# Patient Record
Sex: MALE | Race: WHITE | NOT HISPANIC OR LATINO | Employment: STUDENT | URBAN - METROPOLITAN AREA
[De-identification: names, ages, dates, MRNs, and addresses within clinical notes are randomized per-mention and may not be internally consistent; named-entity substitution may affect disease eponyms.]

---

## 2023-09-14 ENCOUNTER — OFFICE VISIT (OUTPATIENT)
Dept: FAMILY MEDICINE | Facility: CLINIC | Age: 18
End: 2023-09-14

## 2023-09-14 VITALS
SYSTOLIC BLOOD PRESSURE: 124 MMHG | OXYGEN SATURATION: 96 % | HEART RATE: 91 BPM | WEIGHT: 310 LBS | DIASTOLIC BLOOD PRESSURE: 80 MMHG | TEMPERATURE: 98.1 F

## 2023-09-14 DIAGNOSIS — F90.2 ADHD (ATTENTION DEFICIT HYPERACTIVITY DISORDER), COMBINED TYPE: Primary | ICD-10-CM

## 2023-09-14 PROCEDURE — 99203 OFFICE O/P NEW LOW 30 MIN: CPT | Performed by: PHYSICIAN ASSISTANT

## 2023-09-14 RX ORDER — DEXTROAMPHETAMINE SACCHARATE, AMPHETAMINE ASPARTATE, DEXTROAMPHETAMINE SULFATE AND AMPHETAMINE SULFATE 2.5; 2.5; 2.5; 2.5 MG/1; MG/1; MG/1; MG/1
10 TABLET ORAL DAILY
Qty: 30 TABLET | Refills: 0 | Status: SHIPPED | OUTPATIENT
Start: 2023-10-15 | End: 2023-11-14

## 2023-09-14 RX ORDER — LISDEXAMFETAMINE DIMESYLATE 70 MG/1
70 CAPSULE ORAL EVERY MORNING
COMMUNITY
End: 2023-11-30

## 2023-09-14 RX ORDER — LISDEXAMFETAMINE DIMESYLATE 70 MG/1
70 CAPSULE ORAL DAILY
Qty: 30 CAPSULE | Refills: 0 | Status: SHIPPED | OUTPATIENT
Start: 2023-09-14 | End: 2023-09-18 | Stop reason: ALTCHOICE

## 2023-09-14 RX ORDER — DEXTROAMPHETAMINE SACCHARATE, AMPHETAMINE ASPARTATE, DEXTROAMPHETAMINE SULFATE AND AMPHETAMINE SULFATE 2.5; 2.5; 2.5; 2.5 MG/1; MG/1; MG/1; MG/1
10 TABLET ORAL DAILY
Qty: 30 TABLET | Refills: 0 | Status: SHIPPED | OUTPATIENT
Start: 2023-11-15 | End: 2023-12-15

## 2023-09-14 RX ORDER — EPINEPHRINE 0.3 MG/.3ML
0.3 INJECTION SUBCUTANEOUS PRN
COMMUNITY
Start: 2023-03-29

## 2023-09-14 RX ORDER — DEXTROAMPHETAMINE SACCHARATE, AMPHETAMINE ASPARTATE, DEXTROAMPHETAMINE SULFATE AND AMPHETAMINE SULFATE 2.5; 2.5; 2.5; 2.5 MG/1; MG/1; MG/1; MG/1
10 TABLET ORAL DAILY
Qty: 30 TABLET | Refills: 0 | Status: SHIPPED | OUTPATIENT
Start: 2023-09-14 | End: 2023-09-19

## 2023-09-14 RX ORDER — LISDEXAMFETAMINE DIMESYLATE 70 MG/1
70 CAPSULE ORAL DAILY
Qty: 30 CAPSULE | Refills: 0 | Status: SHIPPED | OUTPATIENT
Start: 2023-10-15 | End: 2023-11-14

## 2023-09-14 RX ORDER — DEXTROAMPHETAMINE SACCHARATE, AMPHETAMINE ASPARTATE, DEXTROAMPHETAMINE SULFATE AND AMPHETAMINE SULFATE 2.5; 2.5; 2.5; 2.5 MG/1; MG/1; MG/1; MG/1
10 TABLET ORAL EVERY EVENING
COMMUNITY
End: 2024-08-02

## 2023-09-14 RX ORDER — LISDEXAMFETAMINE DIMESYLATE 70 MG/1
70 CAPSULE ORAL DAILY
Qty: 30 CAPSULE | Refills: 0 | Status: SHIPPED | OUTPATIENT
Start: 2023-11-15 | End: 2023-11-30

## 2023-09-14 NOTE — PROGRESS NOTES
Assessment & Plan     ADHD (attention deficit hyperactivity disorder), combined type-pt using meds responsibly, highest dose of Vyvanse so cannot increase further. Discussed with pt and he will work on consistently taking Adderall in afternoon and see how his symptoms are controlled based on this   Monitor for worsening attention symptoms  Consider change of meds if uncontrolled in 3 months  - lisdexamfetamine (VYVANSE) 70 MG capsule  Dispense: 30 capsule; Refill: 0  - lisdexamfetamine (VYVANSE) 70 MG capsule  Dispense: 30 capsule; Refill: 0  - lisdexamfetamine (VYVANSE) 70 MG capsule  Dispense: 30 capsule; Refill: 0  - amphetamine-dextroamphetamine (ADDERALL) 10 MG tablet  Dispense: 30 tablet; Refill: 0  - amphetamine-dextroamphetamine (ADDERALL) 10 MG tablet  Dispense: 30 tablet; Refill: 0  - amphetamine-dextroamphetamine (ADDERALL) 10 MG tablet  Dispense: 30 tablet; Refill: 0           Follow up 3 months OV    No follow-ups on file.    Kenneth Kerr PA-C  [facility]    Subjective   Dave is a 17 year old, presenting for the following health issues:  Consult (NP consult on ADD medications.)    HPI     PDMP reviewed, no concerns. Pt presents to establish care for ADHD medications and possible medication change. Previously treated at [facility]. Taking Vyvanse 70mg, Adderall 10mg in the afternoon/evening (1-2x a week). Feels he could benefit from higher Vyvanse dose-feels effect doesn't last as long as he would like. Takes Vyvanse at 9am, feels he loses focus by 1-2pm.     When he is off meds, he cannot focus and is much less productive. Work is impacted when he doesn't take his meds.     Review of Systems   Constitutional, HEENT, cardiovascular, pulmonary, gi and gu systems are negative, except as otherwise noted.      Objective    /80 (BP Location: Right arm, Patient Position: Sitting, Cuff Size: Adult Large)   Pulse 91   Temp 98.1  F (36.7  C) (Temporal)   Wt 140.6 kg (310 lb)    SpO2 96%   There is no height or weight on file to calculate BMI.  Physical Exam   GENERAL: healthy, alert and no distress  NECK: no adenopathy, no asymmetry, masses, or scars and thyroid normal to palpation  RESP: lungs clear to auscultation - no rales, rhonchi or wheezes  CV: regular rate and rhythm, normal S1 S2, no S3 or S4, no murmur, click or rub, no peripheral edema and peripheral pulses strong  ABDOMEN: soft, nontender, no hepatosplenomegaly, no masses and bowel sounds normal  MS: no gross musculoskeletal defects noted, no edema  PSYCH: mentation appears normal, affect normal/bright

## 2023-09-14 NOTE — LETTER
[facility]          [address], [address]  [place], [place] [address]  [phone]  Fax [phone]    09/14/23    Patient: Dave Sung  YOB: 2005  Medical Record Number: [id number]                                                Controlled Substance Agreement  I understand that my care provider has prescribed controlled substances (narcotics, tranquilizers, and/or stimulants) to help manage my condition(s).  I am taking this medicine to help me function or work.  I know that this is strong medicine.  It could have serious side effects and even cause a dependency on the drug.  If I stop these medicines suddenly, I could have severe withdrawal symptoms.    The risks, benefits, and side effects of these medication(s) were explained to me.  I agree that:  I will take part in other treatments as advised by my provider.  This may be psychiatry or counseling, physical therapy, behavioral therapy, group treatment, or a referral to a pain clinic.  I will reduce or stop my medicine when my provider tells me to do so.   I will take my medicines as prescribed.  I will not change the dose or schedule unless my provider tells me to.  There will be no refills if I  run out early.   I may be contacted at any time without warning and asked to complete a drug test or pill count.   I will keep all my appointments at the clinic.  If I miss appointments or fail to follow instructions, my provider may stop my medicine.  I will not ask other providers to prescribe controlled substances. And I will not accept controlled substances from other people. If I need another prescribed controlled substance for a new reason, I will notify my provider within one business day.  If I enroll in the [place] Medical Marijuana program, I will tell my provider.  I will also sign an agreement to share my medical records with my provider.  I will use one pharmacy to fill all of my controlled substance  prescriptions.  If my prescription is mailed to my pharmacy, it may take 5 to 7 days for my medicine to be ready.  I understand that my provider, clinic care team, and pharmacy can track controlled substance prescriptions from other providers through a central database (prescription monitoring program).  I will bring in my list of medications (or my medicine bottles) each time I come to the clinic.  REV-  04/2016                                                                                                               Page  1 of 2    [facility]      09/14/23    Patient: Dave Sung  YOB: 2005  Medical Record Number: [id number]    Refills of controlled substances will be made only during office hours.  It is up to me to make sure that I do not run out of my medicines on weekends or holidays.    I am responsible for my prescriptions.  If the medicine is lost or stolen, it will not be replaced.   I also agree not to share these medicines with anyone.  I agree to not use ANY illegal or recreational drugs.  This includes marijuana, cocaine, bath salts or other drugs.  I agree not to use alcohol unless my provider says I may.  I agree to give urine samples whenever asked.  If I fail to give a urine sample, the provider may stop my medicine.     I will tell my nurse or provider right away if I become pregnant or have a new medical problem treated outside of [facility].  I understand that this medicine can affect my thinking and judgment.  It may be unsafe for me to drive, use machinery and do dangerous tasks.  I will not do any of these things until I know how the medicine affects me.  If my dose changes, I will wait to see how it affects me.  I will contact my provider if I have concerns about medicine side effects.  I understand that if I do not follow any of the conditions above, my prescriptions or treatment may be stopped.    I agree that my provider, clinic care team,  and pharmacy may work with any city, state or federal law enforcement agency that investigates the misuse, sale, or other diversion of my controlled medicine. I will allow my provider to discuss my care with or share a copy of this agreement with any other treating provider, pharmacy or emergency room where I receive care.  I agree to give up (waive) any right of privacy or confidentiality with respect to these authorizations.   I have read this agreement and have asked questions about anything I did not understand.   ___________________________________    ___________________________  Patient Signature                                                           Date and Time  ___________________________________     ____________________________  Witness                                                                            Date and Time  ___________________________________  Kenneth Kerr PA-C  REV-  04/2016                                                                                                                                                                 Page 2 of 2

## 2023-09-18 ENCOUNTER — TELEPHONE (OUTPATIENT)
Dept: FAMILY MEDICINE | Facility: CLINIC | Age: 18
End: 2023-09-18

## 2023-09-18 DIAGNOSIS — F90.2 ADHD (ATTENTION DEFICIT HYPERACTIVITY DISORDER), COMBINED TYPE: Primary | ICD-10-CM

## 2023-09-18 RX ORDER — LISDEXAMFETAMINE DIMESYLATE 60 MG/1
60 CAPSULE ORAL EVERY MORNING
Qty: 30 CAPSULE | Refills: 0 | Status: SHIPPED | OUTPATIENT
Start: 2023-09-18 | End: 2023-11-30

## 2023-09-18 NOTE — TELEPHONE ENCOUNTER
Patients mother called into the CS line asking if Kenneth can send in the 60 mg of Vyvanse-they are having a hard time getting the 70 mg but they have the 60 mg tablets at the [facility] in [place]. Routing to Kenneth for approval.      Pending Prescriptions:                       Disp   Refills    lisdexamfetamine (VYVANSE) 60 MG capsule         0            Sig: Take 1 capsule (60 mg) by mouth every morning

## 2023-09-19 DIAGNOSIS — F90.2 ADHD (ATTENTION DEFICIT HYPERACTIVITY DISORDER), COMBINED TYPE: ICD-10-CM

## 2023-09-19 RX ORDER — DEXTROAMPHETAMINE SACCHARATE, AMPHETAMINE ASPARTATE, DEXTROAMPHETAMINE SULFATE AND AMPHETAMINE SULFATE 2.5; 2.5; 2.5; 2.5 MG/1; MG/1; MG/1; MG/1
10 TABLET ORAL DAILY
Qty: 30 TABLET | Refills: 0 | Status: SHIPPED | OUTPATIENT
Start: 2023-09-19 | End: 2024-01-30

## 2023-09-19 NOTE — TELEPHONE ENCOUNTER
Dave Sung is requesting a refill of:    Pending Prescriptions:                       Disp   Refills    amphetamine-dextroamphetamine (ADDERALL) *30 tab*0            Sig: Take 1 tablet (10 mg) by mouth daily    [facility] has the Adderall in stock today. He is wanting the 10mg sent there

## 2023-11-30 DIAGNOSIS — F90.2 ADHD (ATTENTION DEFICIT HYPERACTIVITY DISORDER), COMBINED TYPE: ICD-10-CM

## 2023-11-30 RX ORDER — LISDEXAMFETAMINE DIMESYLATE 70 MG/1
70 CAPSULE ORAL DAILY
Qty: 30 CAPSULE | Refills: 0 | Status: SHIPPED | OUTPATIENT
Start: 2023-11-30 | End: 2024-01-30

## 2023-11-30 NOTE — TELEPHONE ENCOUNTER
Pt called asking for his vyvanse to be switched to  pharmacy due to his regular pharmacy being out of stock.    Dave Sung is requesting a refill of:    Pending Prescriptions:                       Disp   Refills    lisdexamfetamine (VYVANSE) 70 MG capsule  30 cap*0            Sig: Take 1 capsule (70 mg) by mouth daily

## 2024-01-22 DIAGNOSIS — F90.2 ADHD (ATTENTION DEFICIT HYPERACTIVITY DISORDER), COMBINED TYPE: ICD-10-CM

## 2024-01-22 RX ORDER — LISDEXAMFETAMINE DIMESYLATE 70 MG/1
70 CAPSULE ORAL DAILY
Qty: 30 CAPSULE | Refills: 0 | OUTPATIENT
Start: 2024-01-22

## 2024-01-22 NOTE — TELEPHONE ENCOUNTER
Dave Sung is requesting a refill of:    Pending Prescriptions:                       Disp   Refills    lisdexamfetamine (VYVANSE) 70 MG capsule *30 cap*0            Sig: TAKE 1 CAPSULE (70 MG) BY MOUTH DAILY    Please close encounter if RX was sent. Thanks, Keli

## 2024-01-30 NOTE — TELEPHONE ENCOUNTER
Pt scheduled an apt for this Fri 2/2/24.  Pt is wondering if he can get his medication sooner if he has apt scheduled? He has been out of medication for awhile.  I informed because these are controlled substances that the answer is no but will run that by Kenneth also.    Thanks, Holly        Pending Prescriptions:                       Disp   Refills    amphetamine-dextroamphetamine (ADDERALL) *30 tab*0            Sig: Take 1 tablet (10 mg) by mouth daily    lisdexamfetamine (VYVANSE) 70 MG capsule  30 cap*0            Sig: Take 1 capsule (70 mg) by mouth daily  Refused Prescriptions:                       Disp   Refills    lisdexamfetamine (VYVANSE) 70 MG capsule [*30 cap*0        Sig: TAKE 1 CAPSULE (70 MG) BY MOUTH DAILY  Refused By: KENNETH CHEW  Reason for Refusal: Patient needs appointment

## 2024-01-31 RX ORDER — DEXTROAMPHETAMINE SACCHARATE, AMPHETAMINE ASPARTATE, DEXTROAMPHETAMINE SULFATE AND AMPHETAMINE SULFATE 2.5; 2.5; 2.5; 2.5 MG/1; MG/1; MG/1; MG/1
10 TABLET ORAL DAILY
Qty: 30 TABLET | Refills: 0 | Status: SHIPPED | OUTPATIENT
Start: 2024-01-31 | End: 2024-07-24

## 2024-01-31 RX ORDER — LISDEXAMFETAMINE DIMESYLATE 70 MG/1
70 CAPSULE ORAL DAILY
Qty: 30 CAPSULE | Refills: 0 | Status: SHIPPED | OUTPATIENT
Start: 2024-01-31 | End: 2024-07-24

## 2024-01-31 NOTE — TELEPHONE ENCOUNTER
Approved, will absolutely not fill further until OV  Kenneth Kerr PA-C  [facility]

## 2024-02-02 ENCOUNTER — OFFICE VISIT (OUTPATIENT)
Dept: FAMILY MEDICINE | Facility: CLINIC | Age: 19
End: 2024-02-02

## 2024-02-02 VITALS
SYSTOLIC BLOOD PRESSURE: 122 MMHG | OXYGEN SATURATION: 97 % | RESPIRATION RATE: 20 BRPM | DIASTOLIC BLOOD PRESSURE: 88 MMHG | WEIGHT: 315 LBS | TEMPERATURE: 98 F | HEART RATE: 94 BPM

## 2024-02-02 DIAGNOSIS — F90.2 ADHD (ATTENTION DEFICIT HYPERACTIVITY DISORDER), COMBINED TYPE: Primary | ICD-10-CM

## 2024-02-02 PROCEDURE — 99213 OFFICE O/P EST LOW 20 MIN: CPT | Performed by: PHYSICIAN ASSISTANT

## 2024-02-02 RX ORDER — DEXTROAMPHETAMINE SACCHARATE, AMPHETAMINE ASPARTATE, DEXTROAMPHETAMINE SULFATE AND AMPHETAMINE SULFATE 2.5; 2.5; 2.5; 2.5 MG/1; MG/1; MG/1; MG/1
10 TABLET ORAL DAILY
Qty: 30 TABLET | Refills: 0 | Status: SHIPPED | OUTPATIENT
Start: 2024-03-04 | End: 2024-04-03

## 2024-02-02 RX ORDER — LISDEXAMFETAMINE DIMESYLATE 70 MG/1
70 CAPSULE ORAL DAILY
Qty: 30 CAPSULE | Refills: 0 | Status: SHIPPED | OUTPATIENT
Start: 2024-02-02 | End: 2024-03-03

## 2024-02-02 RX ORDER — LISDEXAMFETAMINE DIMESYLATE 70 MG/1
70 CAPSULE ORAL DAILY
Qty: 30 CAPSULE | Refills: 0 | Status: SHIPPED | OUTPATIENT
Start: 2024-03-04 | End: 2024-04-03

## 2024-02-02 RX ORDER — DEXTROAMPHETAMINE SACCHARATE, AMPHETAMINE ASPARTATE, DEXTROAMPHETAMINE SULFATE AND AMPHETAMINE SULFATE 2.5; 2.5; 2.5; 2.5 MG/1; MG/1; MG/1; MG/1
10 TABLET ORAL DAILY
Qty: 30 TABLET | Refills: 0 | Status: SHIPPED | OUTPATIENT
Start: 2024-02-02 | End: 2024-03-03

## 2024-02-02 RX ORDER — DEXTROAMPHETAMINE SACCHARATE, AMPHETAMINE ASPARTATE, DEXTROAMPHETAMINE SULFATE AND AMPHETAMINE SULFATE 2.5; 2.5; 2.5; 2.5 MG/1; MG/1; MG/1; MG/1
10 TABLET ORAL DAILY
Qty: 30 TABLET | Refills: 0 | Status: SHIPPED | OUTPATIENT
Start: 2024-04-04 | End: 2024-05-04

## 2024-02-02 RX ORDER — LISDEXAMFETAMINE DIMESYLATE 70 MG/1
70 CAPSULE ORAL DAILY
Qty: 30 CAPSULE | Refills: 0 | Status: SHIPPED | OUTPATIENT
Start: 2024-04-04 | End: 2024-05-04

## 2024-02-02 NOTE — NURSING NOTE
Chief Complaint   Patient presents with    Recheck Medication     Non fasting medication check and review for vyvanse and adderall      Pre-visit Screening:  Immunizations:  up to date  Colonoscopy:  na  Mammogram: na  Asthma Action Test/Plan:  na  PHQ9:  PHQ2 done today   GAD7:  na  Questioned patient about current smoking habits Pt. currently smokes.  Advised about smoking cessation.  Ok to leave detailed message on voice mail for today's visit only yes, phone # [phone] (home)

## 2024-02-02 NOTE — PROGRESS NOTES
Assessment & Plan     ADHD (attention deficit hyperactivity disorder), combined type-pt feels this combination and dose is working well. Will refill without change for 6 months  Call for refills in 3 months  - lisdexamfetamine (VYVANSE) 70 MG capsule  Dispense: 30 capsule; Refill: 0  - lisdexamfetamine (VYVANSE) 70 MG capsule  Dispense: 30 capsule; Refill: 0  - lisdexamfetamine (VYVANSE) 70 MG capsule  Dispense: 30 capsule; Refill: 0  - amphetamine-dextroamphetamine (ADDERALL) 10 MG tablet  Dispense: 30 tablet; Refill: 0  - amphetamine-dextroamphetamine (ADDERALL) 10 MG tablet  Dispense: 30 tablet; Refill: 0  - amphetamine-dextroamphetamine (ADDERALL) 10 MG tablet  Dispense: 30 tablet; Refill: 0        Follow up 6 months OV    No follow-ups on file.    Subjective   Dave is a 18 year old, presenting for the following health issues:  Recheck Medication (Non fasting medication check and review for vyvanse and adderall )    HPI     ADHD: taking Vyvanse 70mg and Adderall 10mg. Using 10mg PRN. 70mg dose continues to work well. 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 No side side effects, sleeping well. Up 15lbs in last few months-working on getting back into the gym.           Review of Systems  Constitutional, HEENT, cardiovascular, pulmonary, gi and gu systems are negative, except as otherwise noted.      Objective    /88 (BP Location: Right arm, Patient Position: Sitting, Cuff Size: Adult Large)   Pulse 94   Temp 98  F (36.7  C) (Temporal)   Resp 20   Wt 147.4 kg (325 lb)   SpO2 97%   There is no height or weight on file to calculate BMI.  Physical Exam   GENERAL: alert and no distress  NECK: no adenopathy, no asymmetry, masses, or scars  RESP: lungs clear to auscultation - no rales, rhonchi or wheezes  CV: regular rate and rhythm, normal S1 S2, no S3 or S4, no murmur, click or rub, no peripheral edema  ABDOMEN: soft, nontender, no  hepatosplenomegaly, no masses and bowel sounds normal  MS: no gross musculoskeletal defects noted, no edema  NEURO: Normal strength and tone, mentation intact and speech normal  PSYCH: mentation appears normal, affect normal/bright            Signed Electronically by: Kenneth Kerr PA-C

## 2024-03-13 ENCOUNTER — TELEPHONE (OUTPATIENT)
Dept: FAMILY MEDICINE | Facility: CLINIC | Age: 19
End: 2024-03-13

## 2024-03-13 DIAGNOSIS — F90.2 ADHD (ATTENTION DEFICIT HYPERACTIVITY DISORDER), COMBINED TYPE: Primary | ICD-10-CM

## 2024-03-13 RX ORDER — DEXTROAMPHETAMINE SACCHARATE, AMPHETAMINE ASPARTATE, DEXTROAMPHETAMINE SULFATE AND AMPHETAMINE SULFATE 2.5; 2.5; 2.5; 2.5 MG/1; MG/1; MG/1; MG/1
10 TABLET ORAL DAILY
Qty: 30 TABLET | Refills: 0 | Status: SHIPPED | OUTPATIENT
Start: 2024-03-13 | End: 2024-04-12

## 2024-03-13 RX ORDER — LISDEXAMFETAMINE DIMESYLATE 70 MG/1
70 CAPSULE ORAL DAILY
Qty: 30 CAPSULE | Refills: 0 | Status: SHIPPED | OUTPATIENT
Start: 2024-04-13 | End: 2024-05-13

## 2024-03-13 RX ORDER — LISDEXAMFETAMINE DIMESYLATE 70 MG/1
70 CAPSULE ORAL DAILY
Qty: 30 CAPSULE | Refills: 0 | Status: SHIPPED | OUTPATIENT
Start: 2024-05-14 | End: 2024-06-13

## 2024-03-13 RX ORDER — LISDEXAMFETAMINE DIMESYLATE 70 MG/1
70 CAPSULE ORAL DAILY
Qty: 30 CAPSULE | Refills: 0 | Status: SHIPPED | OUTPATIENT
Start: 2024-03-13 | End: 2024-04-12

## 2024-03-13 RX ORDER — DEXTROAMPHETAMINE SACCHARATE, AMPHETAMINE ASPARTATE, DEXTROAMPHETAMINE SULFATE AND AMPHETAMINE SULFATE 2.5; 2.5; 2.5; 2.5 MG/1; MG/1; MG/1; MG/1
10 TABLET ORAL DAILY
Qty: 30 TABLET | Refills: 0 | Status: SHIPPED | OUTPATIENT
Start: 2024-05-14 | End: 2024-06-13

## 2024-03-13 RX ORDER — DEXTROAMPHETAMINE SACCHARATE, AMPHETAMINE ASPARTATE, DEXTROAMPHETAMINE SULFATE AND AMPHETAMINE SULFATE 2.5; 2.5; 2.5; 2.5 MG/1; MG/1; MG/1; MG/1
10 TABLET ORAL DAILY
Qty: 30 TABLET | Refills: 0 | Status: SHIPPED | OUTPATIENT
Start: 2024-04-13 | End: 2024-05-13

## 2024-03-13 NOTE — TELEPHONE ENCOUNTER
Pt called, meds were sent to [facility], and should be at [facility], ([place] was temp being [facility] was out of meds) can all current and pending scripts be sent to [facility] pharmacy?     Routing to Khushbu Cooper    Can patient be called to inform that meds were sent to correct pharmacy?

## 2024-07-19 DIAGNOSIS — F90.2 ADHD (ATTENTION DEFICIT HYPERACTIVITY DISORDER), COMBINED TYPE: ICD-10-CM

## 2024-07-22 RX ORDER — LISDEXAMFETAMINE DIMESYLATE 70 MG/1
70 CAPSULE ORAL DAILY
COMMUNITY
Start: 2024-07-22

## 2024-07-22 NOTE — TELEPHONE ENCOUNTER
Dave Sung is requesting a refill of:    Refused Prescriptions:                       Disp   Refills    lisdexamfetamine (VYVANSE) 70 MG capsule [*                Sig: TAKE ONE CAPSULE BY MOUTH ONCE DAILY  Refused By: JANEL HARE  Reason for Refusal: Patient needs appointment    Pt due for OV

## 2024-07-24 DIAGNOSIS — F90.2 ADHD (ATTENTION DEFICIT HYPERACTIVITY DISORDER), COMBINED TYPE: ICD-10-CM

## 2024-07-24 RX ORDER — LISDEXAMFETAMINE DIMESYLATE 70 MG/1
70 CAPSULE ORAL DAILY
Qty: 30 CAPSULE | Refills: 0 | Status: SHIPPED | OUTPATIENT
Start: 2024-07-24

## 2024-07-24 RX ORDER — DEXTROAMPHETAMINE SACCHARATE, AMPHETAMINE ASPARTATE, DEXTROAMPHETAMINE SULFATE AND AMPHETAMINE SULFATE 2.5; 2.5; 2.5; 2.5 MG/1; MG/1; MG/1; MG/1
10 TABLET ORAL DAILY
Qty: 30 TABLET | Refills: 0 | Status: SHIPPED | OUTPATIENT
Start: 2024-07-24

## 2024-07-24 NOTE — TELEPHONE ENCOUNTER
Dave Sung is requesting a refill of:    Pending Prescriptions:                       Disp   Refills    lisdexamfetamine (VYVANSE) 70 MG capsule  30 cap*0            Sig: Take 1 capsule (70 mg) by mouth daily    amphetamine-dextroamphetamine (ADDERALL) *30 tab*0            Sig: Take 1 tablet (10 mg) by mouth daily    Pt has OV on 8/2/24

## 2024-08-02 ENCOUNTER — OFFICE VISIT (OUTPATIENT)
Dept: FAMILY MEDICINE | Facility: CLINIC | Age: 19
End: 2024-08-02

## 2024-08-02 VITALS
OXYGEN SATURATION: 99 % | WEIGHT: 315 LBS | DIASTOLIC BLOOD PRESSURE: 88 MMHG | SYSTOLIC BLOOD PRESSURE: 132 MMHG | TEMPERATURE: 97.8 F | HEART RATE: 95 BPM

## 2024-08-02 DIAGNOSIS — F90.2 ADHD (ATTENTION DEFICIT HYPERACTIVITY DISORDER), COMBINED TYPE: ICD-10-CM

## 2024-08-02 DIAGNOSIS — Z71.89 ACP (ADVANCE CARE PLANNING): Primary | ICD-10-CM

## 2024-08-02 DIAGNOSIS — Z76.89 HEALTH CARE HOME: ICD-10-CM

## 2024-08-02 DIAGNOSIS — Z79.899 CONTROLLED SUBSTANCE AGREEMENT SIGNED: ICD-10-CM

## 2024-08-02 LAB
AMPHETAMINE (QUAL): POSITIVE
BARBITURATES (QUAL): NEGATIVE
BENZODIAZEPINES (QUAL): NEGATIVE
COCAINE (QUAL): NEGATIVE
ECSTASY OPIATES (QUAL): NEGATIVE
MARIJUANA (QUAL): NEGATIVE
METHADONE (QUAL): NEGATIVE
METHAMPHETAMINE (QUAL): NEGATIVE
OPIATES (QUAL): NEGATIVE
OXYCODONE (QUAL): NEGATIVE
PCP (QUAL): NEGATIVE
TRICYCLIC ANTIDEPRESSANTS (QUAL): NEGATIVE

## 2024-08-02 PROCEDURE — 99213 OFFICE O/P EST LOW 20 MIN: CPT | Performed by: PHYSICIAN ASSISTANT

## 2024-08-02 PROCEDURE — 80305 DRUG TEST PRSMV DIR OPT OBS: CPT | Performed by: PHYSICIAN ASSISTANT

## 2024-08-02 PROCEDURE — G2211 COMPLEX E/M VISIT ADD ON: HCPCS | Performed by: PHYSICIAN ASSISTANT

## 2024-08-02 RX ORDER — DEXTROAMPHETAMINE SACCHARATE, AMPHETAMINE ASPARTATE, DEXTROAMPHETAMINE SULFATE AND AMPHETAMINE SULFATE 2.5; 2.5; 2.5; 2.5 MG/1; MG/1; MG/1; MG/1
10 TABLET ORAL DAILY
Qty: 30 TABLET | Refills: 0 | Status: CANCELLED | OUTPATIENT
Start: 2024-08-02 | End: 2024-09-01

## 2024-08-02 RX ORDER — LISDEXAMFETAMINE DIMESYLATE 70 MG/1
70 CAPSULE ORAL DAILY
Qty: 30 CAPSULE | Refills: 0 | Status: SHIPPED | OUTPATIENT
Start: 2024-08-23 | End: 2024-09-22

## 2024-08-02 RX ORDER — DEXTROAMPHETAMINE SACCHARATE, AMPHETAMINE ASPARTATE, DEXTROAMPHETAMINE SULFATE AND AMPHETAMINE SULFATE 2.5; 2.5; 2.5; 2.5 MG/1; MG/1; MG/1; MG/1
10 TABLET ORAL DAILY
Qty: 30 TABLET | Refills: 0 | Status: CANCELLED | OUTPATIENT
Start: 2024-09-01 | End: 2024-10-01

## 2024-08-02 RX ORDER — DEXTROAMPHETAMINE SACCHARATE, AMPHETAMINE ASPARTATE, DEXTROAMPHETAMINE SULFATE AND AMPHETAMINE SULFATE 2.5; 2.5; 2.5; 2.5 MG/1; MG/1; MG/1; MG/1
10 TABLET ORAL DAILY
Qty: 30 TABLET | Refills: 0 | Status: CANCELLED | OUTPATIENT
Start: 2024-10-01 | End: 2024-10-31

## 2024-08-02 RX ORDER — LISDEXAMFETAMINE DIMESYLATE 70 MG/1
70 CAPSULE ORAL DAILY
Qty: 30 CAPSULE | Refills: 0 | Status: SHIPPED | OUTPATIENT
Start: 2024-09-20 | End: 2024-10-20

## 2024-08-02 RX ORDER — LISDEXAMFETAMINE DIMESYLATE 70 MG/1
70 CAPSULE ORAL DAILY
Qty: 30 CAPSULE | Refills: 0 | Status: SHIPPED | OUTPATIENT
Start: 2024-10-18 | End: 2024-11-17

## 2024-08-02 NOTE — LETTER
[facility]          [address], [address]  [place], [place] [address]  [phone]  Fax [phone]    08/02/24    Patient: Dave Sung  YOB: 2005  Medical Record Number: [id number]                                                Controlled Substance Agreement  I understand that my care provider has prescribed controlled substances (narcotics, tranquilizers, and/or stimulants) to help manage my condition(s).  I am taking this medicine to help me function or work.  I know that this is strong medicine.  It could have serious side effects and even cause a dependency on the drug.  If I stop these medicines suddenly, I could have severe withdrawal symptoms.    The risks, benefits, and side effects of these medication(s) were explained to me.  I agree that:  I will take part in other treatments as advised by my provider.  This may be psychiatry or counseling, physical therapy, behavioral therapy, group treatment, or a referral to a pain clinic.  I will reduce or stop my medicine when my provider tells me to do so.   I will take my medicines as prescribed.  I will not change the dose or schedule unless my provider tells me to.  There will be no refills if I  run out early.   I may be contacted at any time without warning and asked to complete a drug test or pill count.   I will keep all my appointments at the clinic.  If I miss appointments or fail to follow instructions, my provider may stop my medicine.  I will not ask other providers to prescribe controlled substances. And I will not accept controlled substances from other people. If I need another prescribed controlled substance for a new reason, I will notify my provider within one business day.  If I enroll in the [place] Medical Marijuana program, I will tell my provider.  I will also sign an agreement to share my medical records with my provider.  I will use one pharmacy to fill all of my controlled substance  prescriptions.  If my prescription is mailed to my pharmacy, it may take 5 to 7 days for my medicine to be ready.  I understand that my provider, clinic care team, and pharmacy can track controlled substance prescriptions from other providers through a central database (prescription monitoring program).  I will bring in my list of medications (or my medicine bottles) each time I come to the clinic.  REV-  04/2016                                                                                                               Page  1 of 2    [facility]      08/02/24    Patient: Dave Sung  YOB: 2005  Medical Record Number: [id number]    Refills of controlled substances will be made only during office hours.  It is up to me to make sure that I do not run out of my medicines on weekends or holidays.    I am responsible for my prescriptions.  If the medicine is lost or stolen, it will not be replaced.   I also agree not to share these medicines with anyone.  I agree to not use ANY illegal or recreational drugs.  This includes marijuana, cocaine, bath salts or other drugs.  I agree not to use alcohol unless my provider says I may.  I agree to give urine samples whenever asked.  If I fail to give a urine sample, the provider may stop my medicine.     I will tell my nurse or provider right away if I become pregnant or have a new medical problem treated outside of [facility].  I understand that this medicine can affect my thinking and judgment.  It may be unsafe for me to drive, use machinery and do dangerous tasks.  I will not do any of these things until I know how the medicine affects me.  If my dose changes, I will wait to see how it affects me.  I will contact my provider if I have concerns about medicine side effects.  I understand that if I do not follow any of the conditions above, my prescriptions or treatment may be stopped.    I agree that my provider, clinic care team,  and pharmacy may work with any city, state or federal law enforcement agency that investigates the misuse, sale, or other diversion of my controlled medicine. I will allow my provider to discuss my care with or share a copy of this agreement with any other treating provider, pharmacy or emergency room where I receive care.  I agree to give up (waive) any right of privacy or confidentiality with respect to these authorizations.   I have read this agreement and have asked questions about anything I did not understand.   ___________________________________    ___________________________  Patient Signature                                                           Date and Time  ___________________________________     ____________________________  Witness                                                                            Date and Time  ___________________________________  Kenneth Kerr PA-C  REV-  04/2016                                                                                                                                                                 Page 2 of 2

## 2024-08-02 NOTE — PROGRESS NOTES
Assessment & Plan     ADHD (attention deficit hyperactivity disorder), combined type - pt doing well on current regimen   -No Adderall for now, call when needing refills-as he is only taking weekly, only willing to fill 30-60 tablets per 6 months fill cycle  - lisdexamfetamine (VYVANSE) 70 MG capsule  Dispense: 30 capsule; Refill: 0  - lisdexamfetamine (VYVANSE) 70 MG capsule  Dispense: 30 capsule; Refill: 0  - lisdexamfetamine (VYVANSE) 70 MG capsule  Dispense: 30 capsule; Refill: 0  - Urine Drug Screen (BFP)    Controlled substance agreement signed 8.2.24  Plan, as above.  - lisdexamfetamine (VYVANSE) 70 MG capsule  Dispense: 30 capsule; Refill: 0  - lisdexamfetamine (VYVANSE) 70 MG capsule  Dispense: 30 capsule; Refill: 0  - lisdexamfetamine (VYVANSE) 70 MG capsule  Dispense: 30 capsule; Refill: 0      Follow up 6 months OV    No follow-ups on file.    Subjective   Dave is a 18 year old, presenting for the following health issues:  Recheck Medication (Refill medications)    HPI     Pt presents for ADHD recheck. Going well overall.  No side effects. Appetite and sleep have been good.    Work is going well. Is able to start tasks and finish without issues. Feels as though meds work through most of the work day, but will wear off ~1 hour before work ends.   Using Adderall on bad days for ADHD-around once a week. Vyvanse on a consistent basis every day.     Review of Systems  Constitutional, neuro, ENT, endocrine, pulmonary, cardiac, gastrointestinal, genitourinary, musculoskeletal, integument and psychiatric systems are negative, except as otherwise noted.      Objective    /88 (BP Location: Right arm, Patient Position: Sitting, Cuff Size: Adult Large)   Pulse 95   Temp 97.8  F (36.6  C) (Temporal)   Wt (!) 152 kg (335 lb)   SpO2 99%   There is no height or weight on file to calculate BMI.  Physical Exam   GENERAL: alert and no distress  NECK: no adenopathy, no asymmetry, masses, or scars  RESP: lungs  clear to auscultation - no rales, rhonchi or wheezes  CV: regular rate and rhythm, normal S1 S2, no S3 or S4, no murmur, click or rub, no peripheral edema  ABDOMEN: soft, nontender, no hepatosplenomegaly, no masses and bowel sounds normal  MS: no gross musculoskeletal defects noted, no edema  NEURO: Normal strength and tone, mentation intact and speech normal  PSYCH: mentation appears normal, affect normal/bright    Results for orders placed or performed in visit on 08/02/24 (from the past 24 hour(s))   Urine Drug Screen (BFP)   Result Value Ref Range    AMPHETAMINE (QUAL) Positive (A) Negative    BARBITURATES (QUAL) Negative Negative    BENZODIAZEPINES (QUAL) Negative Negative    Cocaine (QUAL) Negative Negative    Methamphetamine (QUAL) Negative Negative    Ecstasy Opiates (QUAL) Negative Negative    Methadone (QUAL) Negative Negative    Oxycodone Negative Negative    PCP (QUAL) Negative Negative    TRICYCLIC ANTIDEPRESSANTS (QUAL) Negative Negative    Marijuana (QUAL) Negative Negative    OPIATES (QUAL) Negative Negative           Signed Electronically by: Kenneth Kerr PA-C

## 2024-08-02 NOTE — NURSING NOTE
Chief Complaint   Patient presents with    Recheck Medication     Refill medications     Pre-visit Screening:  Immunizations:  up to date  Colonoscopy:  NA  Mammogram: NA  Asthma Action Test/Plan:  NA  PHQ9:  NA  GAD7:  NA  Questioned patient about current smoking habits Pt. has never smoked.  Ok to leave detailed message on voice mail for today's visit only Yes, phone # [phone]

## 2024-11-13 DIAGNOSIS — F90.2 ADHD (ATTENTION DEFICIT HYPERACTIVITY DISORDER), COMBINED TYPE: ICD-10-CM

## 2024-11-13 RX ORDER — DEXTROAMPHETAMINE SACCHARATE, AMPHETAMINE ASPARTATE, DEXTROAMPHETAMINE SULFATE AND AMPHETAMINE SULFATE 2.5; 2.5; 2.5; 2.5 MG/1; MG/1; MG/1; MG/1
10 TABLET ORAL DAILY
Qty: 30 TABLET | Refills: 0 | Status: SHIPPED | OUTPATIENT
Start: 2024-11-13

## 2024-11-13 NOTE — TELEPHONE ENCOUNTER
Pending Prescriptions:                       Disp   Refills    amphetamine-dextroamphetamine (ADDERALL) *30 tab*0            Sig: TAKE ONE TABLET BY MOUTH ONCE DAILY      Kenneth please review:    Pt last seen 8-2-2024 rtc in six months  Close encounter when finished   Thank you  Licha

## 2024-11-13 NOTE — TELEPHONE ENCOUNTER
Approved, pt only taking rarely so no more fills until seen at next med check  Kenneth Kerr PA-C  [facility]

## 2025-02-25 DIAGNOSIS — F90.2 ADHD (ATTENTION DEFICIT HYPERACTIVITY DISORDER), COMBINED TYPE: Primary | ICD-10-CM

## 2025-02-25 RX ORDER — DEXTROAMPHETAMINE SACCHARATE, AMPHETAMINE ASPARTATE, DEXTROAMPHETAMINE SULFATE AND AMPHETAMINE SULFATE 2.5; 2.5; 2.5; 2.5 MG/1; MG/1; MG/1; MG/1
10 TABLET ORAL 2 TIMES DAILY
Qty: 10 TABLET | Refills: 0 | Status: SHIPPED | OUTPATIENT
Start: 2025-02-25 | End: 2025-02-25

## 2025-02-25 RX ORDER — LISDEXAMFETAMINE DIMESYLATE 70 MG/1
70 CAPSULE ORAL EVERY MORNING
Qty: 10 CAPSULE | Refills: 0 | Status: SHIPPED | OUTPATIENT
Start: 2025-02-25

## 2025-02-25 NOTE — TELEPHONE ENCOUNTER
Patient is scheduled for a medication check visit on 3/5/25 but needs extension of  Pending Prescriptions:                       Disp   Refills    lisdexamfetamine (VYVANSE) 70 MG capsule  10 cap*0            Sig: Take 1 capsule (70 mg) by mouth every morning.    amphetamine-dextroamphetamine (ADDERALL) *10 tab*0            Sig: Take 1 tablet (10 mg) by mouth 2 times daily.    Routing to Dr. Fuchs to send in due to [facility] being out of office-patients mother is requesting to have sent in today because patient is now out.

## 2025-02-26 RX ORDER — DEXTROAMPHETAMINE SACCHARATE, AMPHETAMINE ASPARTATE, DEXTROAMPHETAMINE SULFATE AND AMPHETAMINE SULFATE 2.5; 2.5; 2.5; 2.5 MG/1; MG/1; MG/1; MG/1
10 TABLET ORAL DAILY
Qty: 10 TABLET | Refills: 0 | Status: SHIPPED | OUTPATIENT
Start: 2025-02-26

## 2025-03-29 ENCOUNTER — HEALTH MAINTENANCE LETTER (OUTPATIENT)
Age: 20
End: 2025-03-29

## 2025-07-03 ENCOUNTER — MYC MEDICAL ADVICE (OUTPATIENT)
Dept: FAMILY MEDICINE | Facility: CLINIC | Age: 20
End: 2025-07-03

## 2025-07-03 DIAGNOSIS — F90.2 ADHD (ATTENTION DEFICIT HYPERACTIVITY DISORDER), COMBINED TYPE: Primary | ICD-10-CM

## 2025-07-03 DIAGNOSIS — Z79.899 CONTROLLED SUBSTANCE AGREEMENT SIGNED: ICD-10-CM

## 2025-07-03 DIAGNOSIS — F90.2 ADHD (ATTENTION DEFICIT HYPERACTIVITY DISORDER), COMBINED TYPE: ICD-10-CM

## 2025-07-03 RX ORDER — LISDEXAMFETAMINE DIMESYLATE 70 MG/1
70 CAPSULE ORAL DAILY
COMMUNITY
Start: 2025-07-03

## 2025-07-03 NOTE — TELEPHONE ENCOUNTER
Dave Sung is requesting a refill of:    Refused Prescriptions:                       Disp   Refills    lisdexamfetamine (VYVANSE) 70 MG capsule [*                Sig: TAKE 1 CAPSULE (70 MG) BY MOUTH DAILY.  Refused By: JANEL HARE  Reason for Refusal: OTHER    Sent pt Virally message

## 2025-07-07 RX ORDER — LISDEXAMFETAMINE DIMESYLATE 70 MG/1
70 CAPSULE ORAL DAILY
Qty: 30 CAPSULE | Refills: 0 | Status: SHIPPED | OUTPATIENT
Start: 2025-08-06 | End: 2025-09-05

## 2025-07-07 RX ORDER — LISDEXAMFETAMINE DIMESYLATE 70 MG/1
70 CAPSULE ORAL DAILY
Qty: 30 CAPSULE | Refills: 0 | Status: SHIPPED | OUTPATIENT
Start: 2025-09-05 | End: 2025-10-05

## 2025-07-07 RX ORDER — LISDEXAMFETAMINE DIMESYLATE 70 MG/1
70 CAPSULE ORAL DAILY
Qty: 30 CAPSULE | Refills: 0 | Status: SHIPPED | OUTPATIENT
Start: 2025-07-07 | End: 2025-08-06

## 2025-07-07 NOTE — TELEPHONE ENCOUNTER
Patient is requesting next 3 months of  Pending Prescriptions:                       Disp   Refills    lisdexamfetamine (VYVANSE) 70 MG capsule  30 cap*0            Sig: Take 1 capsule (70 mg) by mouth daily.    lisdexamfetamine (VYVANSE) 70 MG capsule  30 cap*0            Sig: Take 1 capsule (70 mg) by mouth daily.    lisdexamfetamine (VYVANSE) 70 MG capsule  30 cap*0            Sig: Take 1 capsule (70 mg) by mouth daily.    Patient was last seen in March and he is not due to be seen until September. Routing to Kenneth to send in.